# Patient Record
Sex: MALE | Race: OTHER | HISPANIC OR LATINO | URBAN - METROPOLITAN AREA
[De-identification: names, ages, dates, MRNs, and addresses within clinical notes are randomized per-mention and may not be internally consistent; named-entity substitution may affect disease eponyms.]

---

## 2018-02-24 ENCOUNTER — EMERGENCY (EMERGENCY)
Facility: HOSPITAL | Age: 53
LOS: 1 days | Discharge: HOME | End: 2018-02-24
Attending: EMERGENCY MEDICINE
Payer: MEDICAID

## 2018-02-24 VITALS
DIASTOLIC BLOOD PRESSURE: 99 MMHG | HEART RATE: 92 BPM | TEMPERATURE: 98 F | RESPIRATION RATE: 22 BRPM | OXYGEN SATURATION: 99 % | SYSTOLIC BLOOD PRESSURE: 174 MMHG

## 2018-02-24 VITALS — HEIGHT: 73 IN | WEIGHT: 279.99 LBS

## 2018-02-24 PROCEDURE — 99284 EMERGENCY DEPT VISIT MOD MDM: CPT | Mod: 25

## 2018-02-24 PROCEDURE — 73030 X-RAY EXAM OF SHOULDER: CPT

## 2018-02-24 PROCEDURE — 73030 X-RAY EXAM OF SHOULDER: CPT | Mod: 26,RT

## 2018-02-24 PROCEDURE — 71101 X-RAY EXAM UNILAT RIBS/CHEST: CPT | Mod: 26

## 2018-02-24 PROCEDURE — 71101 X-RAY EXAM UNILAT RIBS/CHEST: CPT

## 2018-02-24 PROCEDURE — 99284 EMERGENCY DEPT VISIT MOD MDM: CPT

## 2018-02-24 NOTE — ED PROVIDER NOTE - CARE PLAN
Principal Discharge DX:	Acute pain of right shoulder  Assessment and plan of treatment:	Continue with pain medication as discussed  Secondary Diagnosis:	Bruised ribs, right, initial encounter

## 2018-02-24 NOTE — ED PROVIDER NOTE - ATTENDING CONTRIBUTION TO CARE
Pt c/o right rib pain and right shoulder pain.  + pain with rom of right shoulder and some right chest wall tenderness. No resp distress. Plan: xray and pain control.

## 2018-02-24 NOTE — ED PROVIDER NOTE - OBJECTIVE STATEMENT
52 yr old M presented to ED with right rib pain x 1 week . Pt stated that he was skating when he tripped and fell resulting in right shoulder, right chest pain. Pt denies nay head injury . Pt also explained that he has been taking pain medication for his pain but his shoulder have bee hurting a lot. Pt also explained that his shoulder pain is exacerbated with movement of his shoulder. Pt denies any numbness, weakness or paraesthesia to his shoulder.

## 2018-02-24 NOTE — ED ADULT TRIAGE NOTE - CHIEF COMPLAINT QUOTE
pt reports a fall last week  and now having pain to right ribs, reports worsens with deep breath and movement. pt reports cant lift his left arm

## 2018-02-24 NOTE — ED PROVIDER NOTE - PROGRESS NOTE DETAILS
Pt state that he feels fine it he is not moving his shoulder. Pt X-ray reviewed and no fracture seen.

## 2018-03-21 NOTE — ED ADULT TRIAGE NOTE - NSWEIGHTCALCTOOLDRUG_GEN_A_CORE
After Visit Summary   3/21/2018    Chao Kearney    MRN: [id number]           Patient Information     Date Of Birth          1947        Visit Information        Provider Department      3/21/2018 11:30 AM Chan Cook MD [facility]        Today's Diagnoses     Seborrheic keratosis    -  1    Brain injury with loss of consciousness, sequela (H)        Essential hypertension           Follow-ups after your visit        Who to contact     If you have questions or need follow up information about today's clinic visit or your schedule please contact [facility] directly at [phone].  Normal or non-critical lab and imaging results will be communicated to you by OpenBuildings[employer], letter or phone within 4 business days after the clinic has received the results. If you do not hear from us within 7 days, please contact the clinic through [employer] or phone. If you have a critical or abnormal lab result, we will notify you by phone as soon as possible.  Submit refill requests through [employer] or call your pharmacy and they will forward the refill request to us. Please allow 3 business days for your refill to be completed.          Additional Information About Your Visit        MyChart Information     [employer] gives you secure access to your electronic health record. If you see a primary care provider, you can also send messages to your care team and make appointments. If you have questions, please call your primary care clinic.  If you do not have a primary care provider, please call [phone] and they will assist you.        Care EveryWhere ID     This is your Care EveryWhere ID. This could be used by other organizations to access your [place] medical records  [id number]        Your Vitals Were     Pulse Temperature BMI (Body Mass Index)             68 97.6  F (36.4  C) (Tympanic) 28.77 kg/m2          Blood Pressure from Last 3 Encounters:   03/21/18 122/66    02/06/18 126/80   08/30/17 124/80    Weight from Last 3 Encounters:   03/21/18 189 lb 3.2 oz (85.8 kg)   02/06/18 193 lb (87.5 kg)   08/30/17 189 lb 9.6 oz (86 kg)              Today, you had the following     No orders found for display       Primary Care Provider Office Phone # Fax #    Chan BHAGAT MD Joey [phone]       [address] [facility] [address]        Equal Access to Services     [facility] Center[facility] : Hadii aad ku hadasho Soomaali, waaxda luqadaha, qaybta kaalmada adeegyada, waxleandro villanueva hayjamil pacheco . So [facility] [phone].    ATENCIÓN: Si habla español, tiene a diaz disposición servicios gratuitos de asistencia lingüística. Llame al [phone].    We comply with applicable federal civil rights laws and [place] laws. We do not discriminate on the basis of race, color, national origin, age, disability, sex, sexual orientation, or gender identity.            Thank you!     Thank you for choosing [facility] AND hospitals  for your care. Our goal is always to provide you with excellent care. Hearing back from our patients is one way we can continue to improve our services. Please take a few minutes to complete the written survey that you may receive in the mail after your visit with us. Thank you!             Your Updated Medication List - Protect others around you: Learn how to safely use, store and throw away your medicines at www.disposemymeds.org.          This list is accurate as of 3/21/18  3:04 PM.  Always use your most recent med list.                   Brand Name Dispense Instructions for use Diagnosis    aspirin EC 81 MG EC tablet      Take 81 mg by mouth daily with food        hydrochlorothiazide 12.5 MG Tabs tablet      Take 1 tablet by mouth daily        lisinopril 40 MG tablet    PRINIVIL/ZESTRIL     Take 1 tablet by mouth daily        MULTILEX-T&M Tabs      Take 1 tablet by mouth daily        rosuvastatin 20 MG tablet    CRESTOR     Take 1 tablet  by mouth At Bedtime        sildenafil 100 MG tablet    VIAGRA     Take 1 tablet by mouth as needed Take 30 min to 4 hours before sexual activity. Max 100mg/24hr        sildenafil 20 MG tablet    REVATIO     Take 2-3 tablets by mouth as needed        tamsulosin 0.4 MG capsule    FLOMAX     Take 2 capsules by mouth daily with food        triamcinolone 0.1 % cream    KENALOG          vitamin D3 1000 UNITS Caps      Take 1 capsule by mouth daily            used